# Patient Record
(demographics unavailable — no encounter records)

---

## 2017-06-27 NOTE — ERD
ER Documentation


Chief Complaint


Date/Time


DATE: 6/27/17 


TIME: 23:20


Chief Complaint


fever on and off x 2 days, body pain, chills





HPI


57-year-old male presents here in emergency department for complaints of 

generalized bodyaches, chills, fever on-and-off for the last 2 days. Patient 

does any other symptoms. Patient denies any abdominal pain, flank pain 

hematuria dysuria. Patient denies any nausea or vomiting. Patient denies any 

sick contacts patient denies any cough





ROS


All systems reviewed and are negative except as per history of present illness.





Medications


Home Meds


Active Scripts


Acetaminophen* (Tylophen*) 500 Mg Capsule, 1 CAP PO Q6H Y for PAIN AND OR 

ELEVATED TEMP, #20 CAP


   Prov:JOHAN HOGUE NP         6/28/17


Ibuprofen* (Motrin*) 400 Mg Tab, 400 MG PO Q6H Y for PAIN AND OR ELEVATED TEMP, 

#30 TAB


   Prov:JOHAN HOGUE NP         6/28/17


Phenazopyridine Hcl* (Pyridium*) 200 Mg Tab, 200 MG PO TID Y for URINARY PAIN, #

6 TAB


   Prov:JOHAN HOGUE NP         6/28/17


Ciprofloxacin Hcl* (Ciprofloxacin Hcl*) 500 Mg Tablet, 500 MG PO BID for 10 Days

, TAB


   Prov:JOHAN HOGUE NP         6/28/17


Loperamide Hcl* (Imodium*) 2 Mg Capsule, 2 MG PO .AFTER EA LOOSE BM Y for 

DIARRHEA, #10 TAB


   Prov:SNEHAL CHO MD         7/22/15


Phenazopyridine Hcl* (Pyridium*) 200 Mg Tab, 200 MG PO TID Y for uti, pain, #6 

TAB


   Prov:TEDDY INFANTE         7/18/15


Nitrofurantoin Monohyd Macrocr* (Macrobid*) 100 Mg Capsr, 100 MG PO BID for 14 

Days, CAP


   Prov:TEDDY INFANTE         7/18/15


Reported Medications


Cyclobenzaprine Hcl* (Cyclobenzaprine Hcl*) 10 Mg Tablet, 10 MG PO QHS, TAB


   7/22/15


Simvastatin (Simvastatin) 20 Mg Tablet, 20 MG PO HS, TAB


   7/22/15


Insulin Glargine* (Lantus*) 100 Unit/Ml Soln, 7 UNIT SC HS, EA


   7/18/15


Glipizide* (Glipizide*) 10 Mg Tablet, 10 MG PO BID, TAB


   7/18/15


Metformin* (Glucophage*) 1,000 Mg Tablet, 1000 MG PO BID


   8/23/12





Allergies


Allergies:  


Coded Allergies:  


     No Known Allergy (Unverified , 7/18/15)





PMhx/Soc


History of Surgery:  No


Anesthesia Reaction:  No


Hx Neurological Disorder:  No


Hx Respiratory Disorders:  No


Hx Cardiac Disorders:  Yes (DM)


Hx Psychiatric Problems:  No


Hx Miscellaneous Medical Probl:  Yes (HIGH CHOLESTEROL)


Hx Alcohol Use:  No


Hx Substance Use:  No


Hx Tobacco Use:  No





FmHx


Family History:  No coronary disease, No diabetes, No other





Physical Exam


Vitals





Vital Signs








  Date Time  Temp Pulse Resp B/P Pulse Ox O2 Delivery O2 Flow Rate FiO2


 


6/28/17 01:48 100.0 103 17 112/63 97 Room Air  


 


6/27/17 21:49 98.9 114 20 139/76 98   








Physical Exam


GENERAL:  The patient is well developed and appropriate for usual state of 

health, in no apparent distress.


CHEST:  Clear to auscultation bilaterally. There are no rales, wheezes or 

rhonchi. 


HEART:  Regular rate and rhythm. No murmurs, clicks, rubs or gallops. No S3 or 

S4.


ABDOMEN:  Soft, nontender and nondistended. Good bowel sounds. No rebound or 

guarding. No gross peritonitis. No gross organomegaly or masses. No Finn sign 

or McBurney point tenderness.


BACK:  No midline or flank tenderness.


EXTREMITIES:  Equal pulses bilaterally. There is no peripheral clubbing, 

cyanosis or edema. No focal swelling or erythema. Full range of motion. Grossly 

neurovascularly intact.


NEURO:  Alert and oriented. Cranial nerves 2-12 intact. Motor strength in all 4 

extremities with 5/5 strength.  Sensation grossly intact. Normal speech and 

gait. 


SKIN:  There is no apparent rash or petechia. The skin is warm and dry.


HEMATOLOGIC AND LYMPHATIC:  There is no evidence of excessive bruising or 

lymphedema. No gross cervical, axillary, or inguinal lymphadenopathy.


Result Diagram:  


6/27/17 2300                                                                   

             6/27/17 2300





Results 24 hrs





 Laboratory Tests








Test


  6/27/17


23:00


 


White Blood Count 7.710^3/ul 


 


Red Blood Count 4.4710^6/ul 


 


Hemoglobin 13.6g/dl 


 


Hematocrit 39.1% 


 


Mean Corpuscular Volume 87.5fl 


 


Mean Corpuscular Hemoglobin 30.4pg 


 


Mean Corpuscular Hemoglobin


Concent 34.8g/dl 


 


 


Red Cell Distribution Width 12.8% 


 


Platelet Count 82738^3/UL 


 


Mean Platelet Volume 9.2fl 


 


Neutrophils % 70.9% 


 


Lymphocytes % 20.5% 


 


Monocytes % 7.0% 


 


Eosinophils % 0.7% 


 


Basophils % 0.4% 


 


Nucleated Red Blood Cells % 0.0/100WBC 


 


Neutrophils # 5.410^3/ul 


 


Lymphocytes # 1.610^3/ul 


 


Monocytes # 0.510^3/ul 


 


Eosinophils # 0.110^3/ul 


 


Basophils # 0.010^3/ul 


 


Nucleated Red Blood Cells # 0.010^3/ul 


 


Urine Color YELLOW 


 


Urine Clarity CLEAR 


 


Urine pH 6.0 


 


Urine Specific Gravity 1.017 


 


Urine Ketones TRACEmg/dL 


 


Urine Nitrite NEGATIVEmg/dL 


 


Urine Bilirubin NEGATIVEmg/dL 


 


Urine Urobilinogen NEGATIVEmg/dL 


 


Urine Leukocyte Esterase 3+[place]/ul 


 


Urine Microscopic RBC 1/HPF 


 


Urine Microscopic WBC 6/HPF 


 


Urine Squamous Epithelial


Cells FEW/HPF 


 


 


Urine Hemoglobin NEGATIVEmg/dL 


 


Urine Glucose 3+mg/dL 


 


Urine Total Protein NEGATIVEmg/dl 


 


Sodium Level 136mmol/L 


 


Potassium Level 4.5mmol/L 


 


Chloride Level 100mmol/L 


 


Carbon Dioxide Level 24mmol/L 


 


Anion Gap 17 


 


Blood Urea Nitrogen 17mg/dl 


 


Creatinine 0.69mg/dl 


 


Glucose Level 237mg/dl 


 


Calcium Level 9.5mg/dl 


 


Total Bilirubin 0.1mg/dl 


 


Direct Bilirubin 0.00mg/dl 


 


Indirect Bilirubin 0.1mg/dl 


 


Aspartate Amino Transf


(AST/SGOT) 52IU/L 


 


 


Alanine Aminotransferase


(ALT/SGPT) 61IU/L 


 


 


Alkaline Phosphatase 153IU/L 


 


Total Protein 7.5g/dl 


 


Albumin 4.6g/dl 


 


Globulin 2.90g/dl 


 


Albumin/Globulin Ratio 1.58 


 


Lipase 233U/L 








 Current Medications








 Medications


  (Trade)  Dose


 Ordered  Sig/Tamara


 Route


 PRN Reason  Start Time


 Stop Time Status Last Admin


Dose Admin


 


 Sodium Chloride  1,000 ml @ 


 1,000 mls/hr  Q1H ONCE


 IV


   6/27/17 23:00


 6/27/17 23:59 DC 6/27/17 23:32


 


 


 Ceftriaxone Sodium


  (Rocephin)  50 ml @ 


 100 mls/hr  ONCE  ONCE


 IVPB


   6/28/17 00:00


 6/28/17 00:29 DC 6/28/17 00:20


 





Normal saline IV bolus was given here in emergency department for rehydration, 

patient tolerated IV fluids.





IV Rocephin was given here in emergency department for treatment of 

pyelonephritis





                                   *** Microbiology ***                        

            


                                                                               

            


  INFLUENZA A & B BY EIA  Final  


        INFLU A&B BY EIA            INFLUENZA A NEGATIVE (Ref Range Neg)


                                    INFLUENZA B NEGATIVE (Ref Range Neg)





--------------------------------------------------------------------------------

------------
































Procedures/MDM


Medical Decision Making: Patients symptoms are consistent with acute 

pyelonephritis. Outpatient management is appropriate at this time since patient 

appears well and is hemodynamically stable, no symptoms of sepsis, strict 

return precautions were advised the patient. Patient's blood sugars also 

control. no ketoacidosis noted. There is low suspicion for abdominal 

emergencies at this time. Patients abdominal exam is normal. There is low 

suspicion for sepsis. Patient appears well and is hemodynamically stable.





Patient's diabetic symptoms have stabilized here in the emergency department 

and appear appropriate for outpatient management.


No evidence at this time of diabetic ketoacidosis, hyperosmolar syndrome, or 

severe systemic infection





Disposition: Home. Stable


Prescription ciprofloxacin, Pyridium, ibuprofen 


Instructions: Patient is advised to take medications as prescribed. Patient is 

advised to rest, increase fluid intake and do good perineal hygiene. Patient is 

advised that if symptoms are worse, severe abdominal pain, uncontrolled vomiting

, high fever, severe flank pain, worst signs and symptoms, to return to the 

emergency department immediately.  Otherwise, patient can follow up with 

primary care doctor in 5-7 days. 





.





Departure


Diagnosis:  


 Primary Impression:  


 Pyelonephritis


Condition:  Stable


Patient Instructions:  Pyelonephritis, Female (Adult)





Additional Instructions:  


Patient is advised to take medications as prescribed. Patient is advised to rest

, increase fluid intake and do good perineal hygiene. Patient is advised that 

if symptoms are worse, severe abdominal pain, uncontrolled vomiting, high fever

, severe flank pain, worst signs and symptoms, to return to the emergency 

department immediately.  Otherwise, patient can follow up with primary care 

doctor in 5-7 days.











JOHAN HOGUE. MICHELLE Jun 27, 2017 23:21